# Patient Record
Sex: MALE | HISPANIC OR LATINO | URBAN - METROPOLITAN AREA
[De-identification: names, ages, dates, MRNs, and addresses within clinical notes are randomized per-mention and may not be internally consistent; named-entity substitution may affect disease eponyms.]

---

## 2022-08-11 ENCOUNTER — OFFICE VISIT (OUTPATIENT)
Dept: URBAN - METROPOLITAN AREA CLINIC 23 | Facility: CLINIC | Age: 72
End: 2022-08-11
Payer: MEDICARE

## 2022-08-11 DIAGNOSIS — H04.121 DRY EYE SYNDROME OF RIGHT LACRIMAL GLAND: ICD-10-CM

## 2022-08-11 DIAGNOSIS — H43.812 VITREOUS DEGENERATION, LEFT EYE: ICD-10-CM

## 2022-08-11 DIAGNOSIS — E11.9 TYPE 2 DIABETES MELLITUS W/O COMPLICATION: Primary | ICD-10-CM

## 2022-08-11 PROCEDURE — 99203 OFFICE O/P NEW LOW 30 MIN: CPT | Performed by: OPTOMETRIST

## 2022-08-11 ASSESSMENT — KERATOMETRY
OD: 43.75
OS: 44.00

## 2022-08-11 ASSESSMENT — INTRAOCULAR PRESSURE
OD: 16
OS: 16

## 2022-08-11 NOTE — IMPRESSION/PLAN
Impression: Vitreous degeneration, left eye: H43.812. Left. Condition: stable. Plan: Discussed findings. PVD accounts for patient's complaints. Signs and symptoms of RT/RD discussed, patient to call immediately with any noted symptoms.

## 2022-08-11 NOTE — IMPRESSION/PLAN
Impression: Dry eye syndrome of right lacrimal gland: H04.121. Right. Condition: will continue to monitor. Plan: Discussed findings. Likely secondary to incomplete lid closure OD. Recommend artificial tears throughout the day, gel at bedtime may also be helpful. Call with any worsening symptoms.

## 2022-08-11 NOTE — IMPRESSION/PLAN
Impression: Type 2 diabetes mellitus w/o complication: U82.5. Bilateral. Condition: stable. Plan: Diabetes type II: no background retinopathy, no signs of neovascularization noted. OPTOS photos ordered and reviewed. Discussed ocular and systemic benefits of blood sugar control.

## 2024-09-26 ENCOUNTER — OFFICE VISIT (OUTPATIENT)
Dept: URBAN - METROPOLITAN AREA CLINIC 33 | Facility: CLINIC | Age: 74
End: 2024-09-26
Payer: MEDICARE

## 2024-09-26 DIAGNOSIS — E11.9 TYPE 2 DIABETES MELLITUS W/O COMPLICATION: Primary | ICD-10-CM

## 2024-09-26 DIAGNOSIS — H43.813 VITREOUS DEGENERATION, BILATERAL: ICD-10-CM

## 2024-09-26 DIAGNOSIS — Z96.1 PRESENCE OF INTRAOCULAR LENS: ICD-10-CM

## 2024-09-26 PROCEDURE — 92014 COMPRE OPH EXAM EST PT 1/>: CPT

## 2024-09-26 ASSESSMENT — INTRAOCULAR PRESSURE
OS: 12
OD: 11